# Patient Record
Sex: FEMALE
[De-identification: names, ages, dates, MRNs, and addresses within clinical notes are randomized per-mention and may not be internally consistent; named-entity substitution may affect disease eponyms.]

---

## 2021-10-05 PROBLEM — Z00.00 ENCOUNTER FOR PREVENTIVE HEALTH EXAMINATION: Status: ACTIVE | Noted: 2021-10-05

## 2022-02-17 ENCOUNTER — APPOINTMENT (OUTPATIENT)
Dept: OBGYN | Facility: CLINIC | Age: 57
End: 2022-02-17

## 2023-09-21 ENCOUNTER — OFFICE (OUTPATIENT)
Dept: URBAN - METROPOLITAN AREA CLINIC 29 | Facility: CLINIC | Age: 58
Setting detail: OPHTHALMOLOGY
End: 2023-09-21
Payer: COMMERCIAL

## 2023-09-21 PROBLEM — H16.223 DRY EYE SYNDROME K SICCA; BOTH EYES: Status: ACTIVE | Noted: 2023-09-21

## 2023-09-21 PROBLEM — H02.834 DERMATOCHALASIS; RIGHT UPPER LID, LEFT UPPER LID: Status: ACTIVE | Noted: 2023-09-21

## 2023-09-21 PROBLEM — H02.831 DERMATOCHALASIS; RIGHT UPPER LID, LEFT UPPER LID: Status: ACTIVE | Noted: 2023-09-21

## 2023-09-21 PROCEDURE — SCCOS COSMETIC CONSULTATION: Performed by: OPHTHALMOLOGY

## 2023-09-21 ASSESSMENT — LID POSITION - DERMATOCHALASIS
OD_DERMATOCHALASIS: RUL 2+
OS_DERMATOCHALASIS: LUL 2+

## 2023-09-21 ASSESSMENT — CONFRONTATIONAL VISUAL FIELD TEST (CVF)
OS_FINDINGS: FULL
OD_FINDINGS: FULL

## 2023-09-21 ASSESSMENT — VISUAL ACUITY
OD_BCVA: 20/40-2
OS_BCVA: 20/30

## 2023-09-21 ASSESSMENT — SUPERFICIAL PUNCTATE KERATITIS (SPK)
OD_SPK: 1+
OS_SPK: 1+

## 2024-07-21 ENCOUNTER — EMERGENCY (EMERGENCY)
Facility: HOSPITAL | Age: 59
LOS: 0 days | Discharge: HOME | End: 2024-07-22
Attending: EMERGENCY MEDICINE
Payer: COMMERCIAL

## 2024-07-21 VITALS
RESPIRATION RATE: 19 BRPM | SYSTOLIC BLOOD PRESSURE: 167 MMHG | HEART RATE: 98 BPM | TEMPERATURE: 98 F | HEIGHT: 64 IN | WEIGHT: 130.07 LBS | OXYGEN SATURATION: 99 % | DIASTOLIC BLOOD PRESSURE: 86 MMHG

## 2024-07-21 DIAGNOSIS — H53.9 UNSPECIFIED VISUAL DISTURBANCE: ICD-10-CM

## 2024-07-21 DIAGNOSIS — H43.392 OTHER VITREOUS OPACITIES, LEFT EYE: ICD-10-CM

## 2024-07-21 PROCEDURE — 99284 EMERGENCY DEPT VISIT MOD MDM: CPT

## 2024-07-21 PROCEDURE — 99282 EMERGENCY DEPT VISIT SF MDM: CPT

## 2024-07-21 NOTE — ED PROVIDER NOTE - OBJECTIVE STATEMENT
59 year old female who presents with l eye vision floaters. patient reports noticing "bright light" to peripheral aspect of L eye that began this evening. patient reports symptoms exacerbated with movement right and left. denies fever, headache, diplopia, blurry vision, eye pain/redness. no trauma. patient wears glasses for reading prn.

## 2024-07-21 NOTE — ED PROVIDER NOTE - ATTENDING APP SHARED VISIT CONTRIBUTION OF CARE
Patient is c/o LT eye floater. Denies loss of vision, denies any visual field defects. Denies any trauma. Jenaro eye pain/HA/dizziness. Denies any neurologic symptoms. Denies contact lenses use.   RANJAN/EOMI, no pain on ROM Of the globe, no visual field defects noted on exam.   B/L temporal pulses are strongly palpable and are non-tender.   supple neck,   Lungs: CTA, no wheezing, no crackles.  CNS: awake, alert, o x 3, no focal neurologic deficits.  A/P: Eye visual floaters,   Discussed with ophthalmology on call and recommended outpatient follow up in the eye clinic in AM.   Discussed with patient and she agreed.

## 2024-07-21 NOTE — ED PROVIDER NOTE - CARE PROVIDER_API CALL
Russ Hodges  Ophthalmology  [address]  [place] [address]  Phone: [phone]  Fax: [phone]  Scheduled Appointment: 07/22/2024 08:00 AM

## 2024-07-21 NOTE — ED PROVIDER NOTE - PATIENT PORTAL LINK FT
You can access the FollowMyHealth Patient Portal offered by [facility] by registering at the following website: [email or web address]. By joining [employer]’s FollowMyHealth portal, you will also be able to view your health information using other applications (apps) compatible with our system.

## 2024-07-21 NOTE — ED PROVIDER NOTE - NSFOLLOWUPINSTRUCTIONS_ED_ALL_ED_FT
Please follow up with Dr Hodges opthalmology, tomorrow morning at 8:00 am.   Please be aware of any new or worsening signs or symptoms that should prompt your return to the ER.    If you develop a curtain or veil over your vision or suddenly many new floaters develop, please return to the emergency room immediately.     Eye Floaters    Eye floaters are spots in your vision caused by shadows from specks of material that float around inside your eye. This is usually a normal, age-related change in your eye. Floaters may be more obvious when you look up at the venita or at a bright, blank background.    Floaters can be annoying, but they do not usually cause vision problems. While floaters may be a normal part of aging, it is important to get an eye exam to make sure that floaters are not a sign of a more serious condition.    What are the causes?  In most cases, this condition is caused by age-related changes in the eye. As you age, the jelly-like fluid (vitreous) inside the eyeball shrinks and can become stringy. The stringy strands of vitreous cast shadows on the back of the eye (retina), which is where the nerves needed for vision are located. These shadows show up as floaters in your vision.    Other possible causes of eye floaters include:  A torn retina.  Injury.  Bleeding inside the eye. Diabetes and other conditions can cause blood vessels in the retina to bleed.  A blood clot in the major vein of the retina or its branches (retinal vein occlusion).  Separation (detachment) of the:  Retina.  Vitreous.  Eye inflammation (uveitis).  Eye infection.  What increases the risk?  You may have a higher risk for floaters if you:  Are older. The risk increases with age.  Are nearsighted.  Have diabetes.  Have had cataracts removed.  What are the signs or symptoms?  Floaters cause you to see small, shadowy shapes move across your vision. The shapes move as your eyes move. They drift out of your vision when you keep your eyes still. These shapes may look like:  Specks.  Dots.  Circles.  Squiggly lines.  Thread.  Sometimes floaters appear along with flashes. Flashes usually occur at the edge of your vision. They may look like:  Bursts of light.  Flashing lights.  Lightning streaks.  What is commonly referred to as "stars."  In some cases, seeing flashes can be a sign of a torn or detached retina, which is a serious condition that requires emergency treatment.    How is this diagnosed?  Your health care provider may diagnose floaters and flashes based on your symptoms and medical history. An eye care specialist (ophthalmologist) will do an eye exam to determine whether your floaters are a normal part of aging or a warning sign of a more serious eye problem. The specialist may put drops in your eyes to open your pupils wide (dilate) and then use a scope (slit lamp) to look inside your eye.    If the specialist is unable to see well enough with a slit lamp, you may need imaging tests such as ultrasound.    How is this treated?  If your floaters are the result of aging, you do not need treatment unless they start to affect your vision. Floaters do not go away completely. Most people adapt to the floaters or, over time, the floaters settle below the line of sight.  If a retinal tear or detachment is causing your floaters, you may need surgery.  If you have an underlying condition that is causing floaters, such as an infection, the floaters should go away when that condition is treated.  In rare cases, if the floaters are affecting your vision, surgery to remove the vitreous and replace it with a saltwater solution (vitrectomy) may be considered.  Follow these instructions at home:  Take over-the-counter and prescription medicines only as told by your health care provider.  Do not drive if you have trouble seeing. Ask your health care provider for guidance about when it is and is not safe for you to drive.  Keep all follow-up visits as told by your health care provider. This is important.  Contact a health care provider if you:  Have more floaters than usual.  Develop any new symptoms.  Get help right away if:  You cannot see well because of your floaters.  You develop flashes along with floaters.  Your vision suddenly changes, or you lose your vision completely.  It looks as if a curtain is blocking part of your vision.  Summary  Eye floaters are spots in your vision caused by specks of material that float around inside your eye.  In most cases, eye floaters are caused by age-related changes in the eye.  Most people do not need treatment for eye floaters unless there is another condition causing the floaters, such as a retinal tear or detachment or an infection.  This information is not intended to replace advice given to you by your health care provider. Make sure you discuss any questions you have with your health care provider.

## 2024-07-21 NOTE — ED PROVIDER NOTE - PHYSICAL EXAMINATION
CONSTITUTIONAL: well appearing female, nad  SKIN: skin exam is warm and dry  HEAD: Normocephalic; atraumatic  EYES: PERRL, 3 mm bilateral, EOM intact; conjunctiva and sclera clear. Visual Acuity 20/20 R Eye, 20/25 L Eye (baseline).   ENT: MMM  NECK: ROM intact.  EXT: Normal ROM.    NEURO: awake, alert, following commands, oriented, grossly unremarkable. No Focal deficits. GCS 15.   PSYCH: Cooperative, appropriate. CONSTITUTIONAL: well appearing female, nad  SKIN: skin exam is warm and dry  HEAD: Normocephalic; atraumatic  EYES: PERRL, 3 mm bilateral, EOM intact; conjunctiva and sclera clear. Visual Acuity 20/20 R Eye, 20/25 L Eye (baseline). IOP: R 13mmHg L 16mmHg  ENT: MMM  NECK: ROM intact.  EXT: Normal ROM.    NEURO: awake, alert, following commands, oriented, grossly unremarkable. No Focal deficits. GCS 15.   PSYCH: Cooperative, appropriate.

## 2024-07-21 NOTE — ED PROVIDER NOTE - PROGRESS NOTE DETAILS
bedside optho us with signs of vitreous hemorrhage, pending optho callback discussed findings with opthalmology resident, dr solares, who reports reassuring exam, plan for follow up tomorrow morning at 8am with dr knight. patient and family verbalize understanding and are comfortable with plan. patient educated on strict signs and symptoms to be aware of that should prompt return to the er.